# Patient Record
(demographics unavailable — no encounter records)

---

## 2024-10-15 NOTE — HISTORY OF PRESENT ILLNESS
[FreeTextEntry1] : 49 yo WM, here for f/u of a LLE traumatic wound when he hit a hard object recently. Pt with spina bifida and wheel chair bound. Wears thigh high bilat. braces. All previous wounds had healed recently. The lateral LLE wound appears healed with some eschar still adherent. Pt states he did notice a minute amt. of discharge from it however.  Had vascular studies done in 10/23 showing right RAHEL of 0.99/left 1.15. No SOI. Pt unable to tolerate any type of compression stockings or ace wrap due to the marked edema and abnormal shape of both LE. 10/15/24 all areas closed. Some mild erythema lower right shin. Not warm . Stressed if it does increase or get warm to go to ER

## 2024-10-15 NOTE — PHYSICAL EXAM
[4 x 4] : 4 x 4  [JVD] : no jugular venous distention  [Normal Thyroid] : the thyroid was normal [Normal Breath Sounds] : Normal breath sounds [Normal Heart Sounds] : normal heart sounds [Normal Rate and Rhythm] : normal rate and rhythm [Ankle Swelling (On Exam)] : present [Ankle Swelling Bilaterally] : severe [Abdomen Masses] : No abdominal massess [Abdomen Tenderness] : ~T ~M No abdominal tenderness [Tender] : nontender [Enlarged] : not enlarged [Alert] : alert [Oriented to Person] : oriented to person [Oriented to Place] : oriented to place [Oriented to Time] : oriented to time [Calm] : calm [de-identified] : adult WM, NAD, alert, Ox3. [employer] [de-identified] : pt declines ace wraps/compression garments, MD aware.  [FreeTextEntry1] : Right medial/posterior leg - closed [de-identified] : mild erythema  [de-identified] : DARWIN [de-identified] : Mechanically cleansed with sterile gauze and normal saline. kerlix  [de-identified] : pt declines ace wraps, MD aware  [FreeTextEntry7] : Left lateral lower leg closed  [de-identified] : DSNGUYEN  [de-identified] : Mechanically cleansed with sterile gauze and normal saline. Kerlix  [de-identified] : False [de-identified] : Every other day [de-identified] : Secondary Dressing [de-identified] : None [de-identified] : Normal [de-identified] : None [de-identified] : None [de-identified] : False [de-identified] : 3x Weekly [de-identified] : Secondary Dressing

## 2024-10-15 NOTE — ASSESSMENT
[Verbal] : Verbal [Written] : Written [Demo] : Demo [Patient] : Patient [Family member] : Family member [Fair - mild discomfort, physical impairment, low acceptance] : Fair - mild discomfort, physical impairment, low acceptance [Needs reinforcement] : needs reinforcement [Dressing changes] : dressing changes [Skin Care] : skin care [Pressure relief] : pressure relief [Signs and symptoms of infection] : sign and symptoms of infection [Nutrition] : nutrition [How and When to Call] : how and when to call [] : Yes [Stable] : stable [Home] : Home [Wheelchair] : Wheelchair [Not Applicable - Long Term Care/Home Health Agency] : Long Term Care/Home Health Agency: Not Applicable [FreeTextEntry2] : Infection prevention Pressure relief   Promote optimal skin care and nutrition. [FreeTextEntry4] : F/U 2WEEKS  Pt advised to go to the ED if s/s of infection occur, pt has a Hx of cellulitis. Pt verbalized understanding

## 2024-10-29 NOTE — PHYSICAL EXAM
[4 x 4] : 4 x 4  [Normal Breath Sounds] : Normal breath sounds [Normal Heart Sounds] : normal heart sounds [2+] : left 2+ [1+] : left 1+ [0] : left 0 [Ankle Swelling (On Exam)] : present [Ankle Swelling Bilaterally] : severe [Alert] : alert [Oriented to Person] : oriented to person [Oriented to Place] : oriented to place [Oriented to Time] : oriented to time [Calm] : calm [de-identified] : Well-developed, Well-nourished in no acute distress. [de-identified] : Within normal limits. [de-identified] : Within normal limits. [de-identified] : Within normal limits. [de-identified] : Bilateral legs with significant lymphedema, left leg wound has fully healed, no drainage, no odor, no acute infection, periwound skin is intact with no cellulitis. [de-identified] : Pt declined Compression, MD aware [FreeTextEntry1] : Right Leg - closed [de-identified] : mild erythema  [de-identified] : DARWIN [de-identified] : Mechanically cleansed with sterile gauze and normal saline. kerlix  [FreeTextEntry7] : Left Leg- closed  [de-identified] : DSNGUYEN  [de-identified] : Mechanically cleansed with sterile gauze and normal saline. Kerlix  [de-identified] : Every other day [de-identified] : Secondary Dressing [de-identified] : Every other day [de-identified] : Secondary Dressing

## 2024-10-29 NOTE — ASSESSMENT
[Verbal] : Verbal [Written] : Written [Demo] : Demo [Patient] : Patient [Family member] : Family member [Fair - mild discomfort, physical impairment, low acceptance] : Fair - mild discomfort, physical impairment, low acceptance [Needs reinforcement] : needs reinforcement [Dressing changes] : dressing changes [Skin Care] : skin care [Pressure relief] : pressure relief [Signs and symptoms of infection] : sign and symptoms of infection [Nutrition] : nutrition [How and When to Call] : how and when to call [Stable] : stable [Home] : Home [Wheelchair] : Wheelchair [Not Applicable - Long Term Care/Home Health Agency] : Long Term Care/Home Health Agency: Not Applicable [] : No [FreeTextEntry2] : Infection prevention Pressure relief   Promote optimal skin care and nutrition. [FreeTextEntry3] : All wounds remain closed [FreeTextEntry4] : F/U 1 month [FreeTextEntry1] : Chronic lymphedema bilateral legs, left leg wound has fully healed, no acute infection

## 2024-10-29 NOTE — PLAN
[FreeTextEntry1] : No wound care needed, patient refusing compression, return to office in four weeks. 25 minutes spent for patient care and medical decision making.

## 2024-10-29 NOTE — HISTORY OF PRESENT ILLNESS
[FreeTextEntry1] : Left leg laceration has fully healed, chronic edema of both lower extremities, no C/O

## 2024-11-19 NOTE — HISTORY OF PRESENT ILLNESS
[FreeTextEntry1] : 49 yo WM, here for f/u of a LLE traumatic wound when he hit a hard object recently. Pt with spina bifida and wheel chair bound. Wears thigh high bilat. braces. All previous wounds had healed recently. The lateral LLE wound appeared healed recently with only an adherent eschar. On today's visit, a small superficial ulcer is present and posterior to this is a superficial adherent eschar. No SOI. Had vascular studies done in 10/23 showing right RAHEL of 0.99/left 1.15.       Pt unable to tolerate any type of compression stockings or ace wrap due to the marked edema and abnormal shape of both LE. Pt states he had his braces checked recently.

## 2024-11-19 NOTE — ASSESSMENT
[Verbal] : Verbal [Written] : Written [Demo] : Demo [Patient] : Patient [Family member] : Family member [Fair - mild discomfort, physical impairment, low acceptance] : Fair - mild discomfort, physical impairment, low acceptance [Needs reinforcement] : needs reinforcement [Dressing changes] : dressing changes [Foot Care] : foot care [Skin Care] : skin care [Pressure relief] : pressure relief [Signs and symptoms of infection] : sign and symptoms of infection [Nutrition] : nutrition [How and When to Call] : how and when to call [Off-loading] : off-loading [Stable] : stable [Home] : Home [Wheelchair] : Wheelchair [Not Applicable - Long Term Care/Home Health Agency] : Long Term Care/Home Health Agency: Not Applicable [] : No [FreeTextEntry2] : Infection prevention Pressure relief   Promote optimal skin care and nutrition. [FreeTextEntry3] : Reopened [FreeTextEntry4] : Patient wound reopened in same area over the last 2 weeks Patient has been self-treating  x 2 weeks with alginate Patient uses  braces on both legs History of lymphedema and history of refusing compression Continue silver alginate 3 x a week Minimal supplies given will order supplies for patient

## 2024-11-19 NOTE — PHYSICAL EXAM
[4 x 4] : 4 x 4  [JVD] : no jugular venous distention  [Normal Thyroid] : the thyroid was normal [Normal Breath Sounds] : Normal breath sounds [Normal Heart Sounds] : normal heart sounds [Normal Rate and Rhythm] : normal rate and rhythm [Ankle Swelling (On Exam)] : present [Ankle Swelling Bilaterally] : severe [Abdomen Masses] : No abdominal massess [Abdomen Tenderness] : ~T ~M No abdominal tenderness [Tender] : nontender [Enlarged] : not enlarged [Alert] : alert [Oriented to Person] : oriented to person [Oriented to Place] : oriented to place [Oriented to Time] : oriented to time [Calm] : calm [de-identified] : adult WM, NAD,alert, Ox3. [de-identified] : Pt declined Compression, MD aware [FreeTextEntry1] : lower leg [FreeTextEntry2] : 1.1 [FreeTextEntry3] : 0.8 [FreeTextEntry4] : 0.1 [de-identified] : brown discoloration [de-identified] : silver alginate [de-identified] : kerlix  [TWNoteComboBox1] : Left [TWNoteComboBox4] : Moderate [TWNoteComboBox5] : No [TWNoteComboBox6] : Pressure [de-identified] : No [de-identified] : other [de-identified] : None [de-identified] : None [de-identified] : 100% [TWNoteComboBox7] : Mechanical [de-identified] : 3x Weekly [de-identified] : Primary Dressing [de-identified] : False [de-identified] : False

## 2024-12-10 NOTE — ASSESSMENT
[Verbal] : Verbal [Written] : Written [Demo] : Demo [Patient] : Patient [Family member] : Family member [Fair - mild discomfort, physical impairment, low acceptance] : Fair - mild discomfort, physical impairment, low acceptance [Needs reinforcement] : needs reinforcement [Dressing changes] : dressing changes [Skin Care] : skin care [Signs and symptoms of infection] : sign and symptoms of infection [How and When to Call] : how and when to call [Pain Management] : pain management [Patient responsibility to plan of care] : patient responsibility to plan of care [Glycemic Control] : glycemic control [] : Yes [FreeTextEntry2] : Infection prevention Localized wound care  Goal remaining pain free regarding wounds [FreeTextEntry4] : Pt reminded to always pad wound site area when using leg brace. Pt also reminded to frequently turn and reposition avoiding continues pressure to any problematic areas.   Follow up in 4 weeks.  [Stable] : stable [Home] : Home [Wheelchair] : Wheelchair [Not Applicable - Long Term Care/Home Health Agency] : Long Term Care/Home Health Agency: Not Applicable

## 2024-12-10 NOTE — HISTORY OF PRESENT ILLNESS
[FreeTextEntry1] : 49 yo WM, here for f/u of a LLE traumatic wound when he hit a hard object recently. Pt with spina bifida and wheel chair bound. Wears thigh high bilat. braces. All previous wounds had healed recently. The lateral LLE wound has healed. No SOI. Had vascular studies done in 10/23 showing right RAHEL of 0.99/left 1.15.  Pt unable to tolerate any type of compression stockings or ace wrap due to the marked edema and abnormal shape of both LE. Pt states he had his braces checked recently.

## 2024-12-10 NOTE — PHYSICAL EXAM
[Normal Thyroid] : the thyroid was normal [Normal Breath Sounds] : Normal breath sounds [Normal Heart Sounds] : normal heart sounds [Normal Rate and Rhythm] : normal rate and rhythm [Ankle Swelling (On Exam)] : present [Ankle Swelling Bilaterally] : severe [Alert] : alert [Oriented to Person] : oriented to person [Oriented to Place] : oriented to place [Oriented to Time] : oriented to time [Calm] : calm [JVD] : no jugular venous distention  [Abdomen Masses] : No abdominal massess [Abdomen Tenderness] : ~T ~M No abdominal tenderness [Tender] : nontender [Enlarged] : not enlarged [de-identified] : adult WM, NAD, alert, Ox3 [4 x 4] : 4 x 4  [Abdominal Pad] : Abdominal Pad [FreeTextEntry1] : Left lower leg - Scab - no open wounds.  [de-identified] : Mechanically cleansed with sterile gauze and normal saline. Kerlix  [TWNoteComboBox4] : None [TWNoteComboBox6] : Pressure [de-identified] : Normal [de-identified] : None [de-identified] : None [de-identified] : No [de-identified] : Daily [de-identified] : Secondary Dressing

## 2025-01-14 NOTE — ASSESSMENT
[Verbal] : Verbal [Written] : Written [Demo] : Demo [Patient] : Patient [Family member] : Family member [Fair - mild discomfort, physical impairment, low acceptance] : Fair - mild discomfort, physical impairment, low acceptance [Needs reinforcement] : needs reinforcement [Skin Care] : skin care [Signs and symptoms of infection] : sign and symptoms of infection [How and When to Call] : how and when to call [Pain Management] : pain management [Patient responsibility to plan of care] : patient responsibility to plan of care [Glycemic Control] : glycemic control [Stable] : stable [Home] : Home [Wheelchair] : Wheelchair [Not Applicable - Long Term Care/Home Health Agency] : Long Term Care/Home Health Agency: Not Applicable [Off-loading] : off-loading [Compression Therapy] : compression therapy [] : No [FreeTextEntry2] : Infection prevention Goal remaining pain free regarding wounds [FreeTextEntry3] : no open wounds, photos not taken due to photo device malfunction  [FreeTextEntry4] : Pt reminded to always pad wound site area when using leg brace. Pt also reminded to frequently turn and reposition avoiding continues pressure to any problematic areas.   Follow up in 4 weeks.  Pt advised to continue moisturizing the lower limb skin, pt verbalized understanding  [FreeTextEntry1] : Left leg wound is closed, no acute infection.

## 2025-01-14 NOTE — PHYSICAL EXAM
[Normal Breath Sounds] : Normal breath sounds [Normal Heart Sounds] : normal heart sounds [2+] : left 2+ [1+] : left 1+ [0] : left 0 [Ankle Swelling (On Exam)] : present [Ankle Swelling Bilaterally] : severe [Alert] : alert [Oriented to Person] : oriented to person [Oriented to Place] : oriented to place [Oriented to Time] : oriented to time [Calm] : calm [de-identified] : Well-developed, Well-nourished in no acute distress. [de-identified] : Within normal limits. [de-identified] : Within normal limits. [de-identified] : Within normal limits. [de-identified] : Left leg wound is closed, no acute infection. [de-identified] : pt declines compression therapy despite encouragement and education on benefits for long term edema control  [FreeTextEntry1] : Left lower leg -no wounds.  [de-identified] : Mechanically cleansed with sterile gauze and normal saline. Protective dry dressing applied during visit  [TWNoteComboBox4] : None [TWNoteComboBox6] : Pressure [de-identified] : Normal [de-identified] : None [de-identified] : None [de-identified] : No [de-identified] : False [de-identified] : False

## 2025-01-14 NOTE — PLAN
[FreeTextEntry1] : No wound care needed, discharged, return to office in one month if needed. 25 minutes spent for patient care and medical decision making.

## 2025-03-11 NOTE — VITALS
[Pain related to present condition?] : The patient's  pain is not related to present condition. [] : No [de-identified] : 0/10

## 2025-03-11 NOTE — PHYSICAL EXAM
[Normal Breath Sounds] : Normal breath sounds [Normal Heart Sounds] : normal heart sounds [2+] : left 2+ [1+] : left 1+ [0] : left 0 [Ankle Swelling (On Exam)] : present [Ankle Swelling Bilaterally] : severe [Alert] : alert [Oriented to Person] : oriented to person [Oriented to Place] : oriented to place [Oriented to Time] : oriented to time [Calm] : calm [de-identified] : Well-developed, Well-nourished in no acute distress. [de-identified] : Within normal limits. [de-identified] : Within normal limits. [de-identified] : Within normal limits. [de-identified] : Left leg wound is closed, no acute infection. [de-identified] : pt declines compression therapy despite encouragement and education on benefits for long term edema control  [FreeTextEntry1] : Left lower leg -healed January 2025 [TWNoteComboBox4] : False [TWNoteComboBox6] : False [de-identified] : False [de-identified] : False [de-identified] : False [de-identified] : False

## 2025-03-11 NOTE — PHYSICAL EXAM
[Normal Breath Sounds] : Normal breath sounds [Normal Heart Sounds] : normal heart sounds [2+] : left 2+ [1+] : left 1+ [0] : left 0 [Ankle Swelling (On Exam)] : present [Ankle Swelling Bilaterally] : severe [Alert] : alert [Oriented to Person] : oriented to person [Oriented to Place] : oriented to place [Oriented to Time] : oriented to time [Calm] : calm [de-identified] : Well-developed, Well-nourished in no acute distress. [de-identified] : Within normal limits. [de-identified] : Within normal limits. [de-identified] : Within normal limits. [de-identified] : Left leg wound is closed, no acute infection. [de-identified] : pt declines compression therapy despite encouragement and education on benefits for long term edema control  [FreeTextEntry1] : Left lower leg -healed January 2025 [TWNoteComboBox4] : False [TWNoteComboBox6] : False [de-identified] : False [de-identified] : False [de-identified] : False [de-identified] : False

## 2025-03-11 NOTE — VITALS
[Pain related to present condition?] : The patient's  pain is not related to present condition. [] : No [de-identified] : 0/10

## 2025-03-11 NOTE — ASSESSMENT
[Verbal] : Verbal [Written] : Written [Demo] : Demo [Patient] : Patient [Family member] : Family member [Fair - mild discomfort, physical impairment, low acceptance] : Fair - mild discomfort, physical impairment, low acceptance [Needs reinforcement] : needs reinforcement [Skin Care] : skin care [Signs and symptoms of infection] : sign and symptoms of infection [How and When to Call] : how and when to call [Pain Management] : pain management [Off-loading] : off-loading [Compression Therapy] : compression therapy [Patient responsibility to plan of care] : patient responsibility to plan of care [Glycemic Control] : glycemic control [Stable] : stable [Home] : Home [Wheelchair] : Wheelchair [Not Applicable - Long Term Care/Home Health Agency] : Long Term Care/Home Health Agency: Not Applicable [] : No [FreeTextEntry2] : Infection prevention Goal remaining pain free regarding wounds [FreeTextEntry3] : no open wounds, area of high pressure remains free of wound and no erythema noted [FreeTextEntry4] : Pt advised to pad wound site area when using leg brace, if the area becomes red, otherwise it is not needed to pad the area as per the MD. Pt also reminded to frequently turn and reposition avoiding continues pressure to any problematic areas.   Follow up as needed, no wound care required. Patient is hesitant to be discharged due to high risk of reoccurrence from pressure of leg brace.  Pt advised to continue moisturizing the lower limb skin, pt verbalized understanding  [FreeTextEntry1] : Advance stage 4 lymphedema, left leg wound is closed, no drainage, no odor, no acute infection.